# Patient Record
Sex: MALE | Race: OTHER | NOT HISPANIC OR LATINO
[De-identification: names, ages, dates, MRNs, and addresses within clinical notes are randomized per-mention and may not be internally consistent; named-entity substitution may affect disease eponyms.]

---

## 2020-12-12 ENCOUNTER — TRANSCRIPTION ENCOUNTER (OUTPATIENT)
Age: 25
End: 2020-12-12

## 2024-02-03 ENCOUNTER — NON-APPOINTMENT (OUTPATIENT)
Age: 29
End: 2024-02-03

## 2024-05-14 ENCOUNTER — APPOINTMENT (OUTPATIENT)
Dept: CARDIOLOGY | Facility: CLINIC | Age: 29
End: 2024-05-14
Payer: MEDICAID

## 2024-05-14 ENCOUNTER — TRANSCRIPTION ENCOUNTER (OUTPATIENT)
Age: 29
End: 2024-05-14

## 2024-05-14 VITALS
HEART RATE: 68 BPM | WEIGHT: 195 LBS | BODY MASS INDEX: 26.41 KG/M2 | HEIGHT: 72 IN | DIASTOLIC BLOOD PRESSURE: 80 MMHG | SYSTOLIC BLOOD PRESSURE: 120 MMHG

## 2024-05-14 DIAGNOSIS — Z82.49 FAMILY HISTORY OF ISCHEMIC HEART DISEASE AND OTHER DISEASES OF THE CIRCULATORY SYSTEM: ICD-10-CM

## 2024-05-14 DIAGNOSIS — Z72.0 TOBACCO USE: ICD-10-CM

## 2024-05-14 DIAGNOSIS — F17.200 NICOTINE DEPENDENCE, UNSPECIFIED, UNCOMPLICATED: ICD-10-CM

## 2024-05-14 DIAGNOSIS — Z83.3 FAMILY HISTORY OF DIABETES MELLITUS: ICD-10-CM

## 2024-05-14 PROBLEM — Z00.00 ENCOUNTER FOR PREVENTIVE HEALTH EXAMINATION: Status: ACTIVE | Noted: 2024-05-14

## 2024-05-14 PROCEDURE — G2211 COMPLEX E/M VISIT ADD ON: CPT | Mod: NC,1L

## 2024-05-14 PROCEDURE — 93000 ELECTROCARDIOGRAM COMPLETE: CPT

## 2024-05-14 PROCEDURE — 99406 BEHAV CHNG SMOKING 3-10 MIN: CPT

## 2024-05-14 PROCEDURE — 99204 OFFICE O/P NEW MOD 45 MIN: CPT | Mod: 25

## 2024-05-14 NOTE — REASON FOR VISIT
[FreeTextEntry1] : Mr. DAYNA WALLACE is a 29 year old man with no significant PMHx who is presenting for evaluation of chest pain and palpitations. He recently graduated with a Masters in finance. He had alcohol to celebrate. The next day he started to "cleanse" himself with a crash diet. For the past 2 weeks, he has had palpitations and chest pain. He says the pain comes and goes, but is worse with movement. He describes it as a "pinch". The palpitations come and go as well. He says that he feels his heart races. Initially he had trouble breathing, but he now is breathing well.  He has FHx of HTN and DM. He smokes cigarette occasionally.

## 2024-05-14 NOTE — REVIEW OF SYSTEMS
[Negative] : Heme/Lymph [SOB] : shortness of breath [Chest Discomfort] : chest discomfort [Palpitations] : palpitations [Dyspnea on exertion] : not dyspnea during exertion [Lower Ext Edema] : no extremity edema [Leg Claudication] : no intermittent leg claudication [Orthopnea] : no orthopnea [PND] : no PND [Syncope] : no syncope

## 2024-05-14 NOTE — ASSESSMENT
[FreeTextEntry1] : Mr. DAYNA WALLACE is a 29 year old man with no significant PMHx who is presenting for evaluation of chest pain and palpitations. His symptoms are atypical and he is low risk.  -Order TTE to rule out structural changes -Order exercise stress ECG test in this low risk individual to evaluate for ischemia and rule out exercise-induced arrythmias -Order 14 day MCOT to evaluate for arrhythmias -Discussed the risks of continued smoking and the cardiovascular benefits of quitting. Encouraged complete smoking cessation. -Encouraged improved lifestyle modifications with these recommendations below: -Plant-based and Mediterranean diets, along with increased fruit, nut, vegetable, legume, and lean vegetable or animal protein (preferably fish) consumption -Engage in at least 150 minutes per week of accumulated moderate-intensity aerobic physical activity or 75 minutes per week of vigorous-intensity aerobic physical activity  Time in encounter, including face to face visit and time reviewing chart:  55 minutes  Jason Sherman MD, FACC Non-Invasive Cardiology [address], [address] Office: [phone]

## 2024-05-24 ENCOUNTER — APPOINTMENT (OUTPATIENT)
Dept: CARDIOLOGY | Facility: CLINIC | Age: 29
End: 2024-05-24
Payer: MEDICAID

## 2024-05-24 PROCEDURE — 93306 TTE W/DOPPLER COMPLETE: CPT

## 2024-05-24 PROCEDURE — 93015 CV STRESS TEST SUPVJ I&R: CPT

## 2024-06-11 ENCOUNTER — APPOINTMENT (OUTPATIENT)
Dept: CARDIOLOGY | Facility: CLINIC | Age: 29
End: 2024-06-11
Payer: MEDICAID

## 2024-06-11 VITALS
SYSTOLIC BLOOD PRESSURE: 124 MMHG | BODY MASS INDEX: 26.41 KG/M2 | WEIGHT: 195 LBS | HEIGHT: 72 IN | DIASTOLIC BLOOD PRESSURE: 82 MMHG

## 2024-06-11 DIAGNOSIS — R00.2 PALPITATIONS: ICD-10-CM

## 2024-06-11 DIAGNOSIS — R07.89 OTHER CHEST PAIN: ICD-10-CM

## 2024-06-11 DIAGNOSIS — I49.3 VENTRICULAR PREMATURE DEPOLARIZATION: ICD-10-CM

## 2024-06-11 PROCEDURE — 99214 OFFICE O/P EST MOD 30 MIN: CPT

## 2024-06-11 PROCEDURE — 93000 ELECTROCARDIOGRAM COMPLETE: CPT

## 2024-06-11 PROCEDURE — G2211 COMPLEX E/M VISIT ADD ON: CPT | Mod: NC,1L

## 2024-06-11 NOTE — REASON FOR VISIT
[FreeTextEntry1] : Mr. DAYNA WALLACE is a 29 year old man with PMHx of low-normal EF and PVCs who is presenting for follow up. He has had palpitations off and on, but feels they are improving.  He says that his heart was racing at times. He has no chest pain or SOB.   He has FHx of HTN and DM. He smokes cigarette occasionally.   TTE 5/24/24 CONCLUSIONS: 1. Left ventricular cavity is normal in size. Left ventricular wall thickness is normal. Left ventricular systolic function is low-normal with an ejection fraction of 53 % by Hough's method of disks. There are no regional wall motion abnormalities seen. 2. Normal left ventricular diastolic function, with normal filling pressure. 3. Normal right ventricular cavity size, with normal wall thickness, and normal systolic function. 4. No significant valvular disease. 5. No echocardiographic evidence of pulmonary hypertension. 6. No prior echocardiogram is available for comparison.  ETT ECG 5/24/24 Conclusions: 1. The ECG is nondiagnostic for Ischemia due to left ventricular hypertrophy. 2. The patient underwent stress testing using the standard Fei protocol. _ The patient exercised for 10 min 30 sec. _ The test was stopped due to fatigue and target heart rate achieved. _ The peak heart rate was 175 bpm; 91 % of predicted maximal heart rate for this patient. _ The patient achieved 12.1 METS which is consistent with average exercise capacity. 3. Arrhythmias: Occasional VPD's occurred during stress and recovery, was unaffected by stress.

## 2024-06-11 NOTE — ASSESSMENT
[FreeTextEntry1] : Mr. DAYNA WALLACE is a 29 year old man with PMHx of low-normal EF and PVCs who is presenting for follow up.   -Reviewed TTE (independently) and ETT ECG (independently) -Order cardiac MRI given low-normal EF and PVCs -Discussed the risks of continued smoking and the cardiovascular benefits of quitting. Encouraged complete smoking cessation. -Encouraged improved lifestyle modifications with these recommendations below: -Plant-based and Mediterranean diets, along with increased fruit, nut, vegetable, legume, and lean vegetable or animal protein (preferably fish) consumption -Engage in at least 150 minutes per week of accumulated moderate-intensity aerobic physical activity or 75 minutes per week of vigorous-intensity aerobic physical activity  Time in encounter, including face to face visit and time reviewing chart:  35 minutes  Jason Sherman MD, FACC Non-Invasive Cardiology [address], [address] Office: [phone]

## 2024-06-28 ENCOUNTER — NON-APPOINTMENT (OUTPATIENT)
Age: 29
End: 2024-06-28

## 2024-09-10 ENCOUNTER — APPOINTMENT (OUTPATIENT)
Dept: CARDIOLOGY | Facility: CLINIC | Age: 29
End: 2024-09-10